# Patient Record
Sex: FEMALE | Race: OTHER | NOT HISPANIC OR LATINO | URBAN - METROPOLITAN AREA
[De-identification: names, ages, dates, MRNs, and addresses within clinical notes are randomized per-mention and may not be internally consistent; named-entity substitution may affect disease eponyms.]

---

## 2018-05-26 ENCOUNTER — EMERGENCY (EMERGENCY)
Facility: HOSPITAL | Age: 18
LOS: 1 days | Discharge: HOME | End: 2018-05-26
Attending: EMERGENCY MEDICINE | Admitting: EMERGENCY MEDICINE
Payer: COMMERCIAL

## 2018-05-26 VITALS
RESPIRATION RATE: 16 BRPM | OXYGEN SATURATION: 99 % | HEART RATE: 85 BPM | SYSTOLIC BLOOD PRESSURE: 106 MMHG | DIASTOLIC BLOOD PRESSURE: 66 MMHG | TEMPERATURE: 98 F

## 2018-05-26 VITALS
SYSTOLIC BLOOD PRESSURE: 105 MMHG | WEIGHT: 123.9 LBS | HEART RATE: 88 BPM | OXYGEN SATURATION: 98 % | DIASTOLIC BLOOD PRESSURE: 71 MMHG | RESPIRATION RATE: 16 BRPM | TEMPERATURE: 98 F
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
## 2018-05-26 DIAGNOSIS — J45.909 UNSPECIFIED ASTHMA, UNCOMPLICATED: ICD-10-CM

## 2018-05-26 DIAGNOSIS — Z79.52 LONG TERM (CURRENT) USE OF SYSTEMIC STEROIDS: ICD-10-CM

## 2018-05-26 DIAGNOSIS — Z91.018 ALLERGY TO OTHER FOODS: ICD-10-CM
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
## 2018-05-26 DIAGNOSIS — T78.40XA ALLERGY, UNSPECIFIED, INITIAL ENCOUNTER: ICD-10-CM

## 2018-05-26 PROCEDURE — 99283 EMERGENCY DEPT VISIT LOW MDM: CPT

## 2018-05-26 PROCEDURE — 99283 EMERGENCY DEPT VISIT LOW MDM: CPT | Mod: 25

## 2018-05-26 PROCEDURE — 96372 THER/PROPH/DIAG INJ SC/IM: CPT

## 2018-05-26 RX ORDER — DEXAMETHASONE 0.5 MG/5ML
10 ELIXIR ORAL ONCE
Qty: 0 | Refills: 0 | Status: COMPLETED | OUTPATIENT
Start: 2018-05-26 | End: 2018-05-26

## 2018-05-26 RX ADMIN — Medication 10 MILLIGRAM(S): at 14:16

## 2018-05-26 NOTE — ED PROVIDER NOTE - MEDICAL DECISION MAKING DETAILS
minor throat tightness no drooling or wheezing   took Benedryl at 6 am  will rx Decadron 10 mg IM 18 yo with prior hx of allergic reactions--now with minor throat tightness no drooling or wheezing - took Benedryl at 6 am-  will rx Decadron 10 mg IM  also medrol dose susan on dc  clinically well appearing no lip or tongue swelling no airway compromise--pt has epipens at home and with her

## 2018-05-26 NOTE — ED ADULT NURSE NOTE - CHIEF COMPLAINT QUOTE
Allergic reaction since last night.  [facility]  "My throat feels itchy and my right eye feels swollen".  No swelling to eye noted, no uticaria, no distress noted, no angioedema noted.  Breathign easily and unlabored, uvula easily seen, speaking full sentences.

## 2018-05-26 NOTE — ED ADULT NURSE NOTE - CHPI ED SYMPTOMS NEG
no vomiting/no cough/no wheezing/no nausea/no difficulty breathing/no difficulty swallowing/no rash/no shortness of breath

## 2018-05-26 NOTE — ED PROVIDER NOTE - OBJECTIVE STATEMENT
16 yo F with hx of asthma / prior hx of allergic reactions/- nut allergies carries an Epipen- states she went to ED at [facility] 4 hr ago for a suspected allergic reaction-  took Benedryl 75 mg at 630 am today before going states she did not eat any nuts- no sob  no lip or tongue swelling- no skin rash--  they did not give her any addl meds- still feel tight in her throat  can easily swallow-  no drooling- no lip swelling /no voice change

## 2018-05-26 NOTE — ED ADULT TRIAGE NOTE - CHIEF COMPLAINT QUOTE
Allergic reaction since last night.  [facility]ent to another ED this [facility]  "My throat feels itchy and my right eye feels swollen".  No swelling to eye noted, no uticaria, no distress noted, no angioedema noted.  Breathign easily and unlabored, uvula easily seen, speaking full sentences.
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
## 2019-12-16 NOTE — ED PROVIDER NOTE - ENMT NEGATIVE STATEMENT, MLM
Shower only/exclude right hand Ears: no ear pain and no hearing problems.Nose: no nasal congestion and no nasal drainage.Mouth/Throat: no dysphagia, no hoarseness and no throat pain.Neck: no lumps, no pain, no stiffness and no swollen glands.